# Patient Record
(demographics unavailable — no encounter records)

---

## 2025-02-27 NOTE — DISCUSSION/SUMMARY
[de-identified] : Chief complaint: Left hip pain  HPI: Patient is a 36-year-old female who presents to the office today for the evaluation of left hip pain which manifested approximately 4 weeks ago without any known fall, injury, or trauma.  Patient reports that the pain seems to be getting worse since onset.  She localizes the pain primarily to the anterior aspect of the left hip by pointing.  Pain is constant but is worse with weightbearing and ambulation.  Patient has been taking Tylenol and Motrin over-the-counter which seems to help.  She reports that she presented to [facility] emergency department where x-rays of the left hip were performed which were negative as per the patient's report.  She does not present with the impression as per the radiologist or with the disc of the x-rays performed.  Patient denies any mechanical symptoms of the left hip.  No clicking or popping noted.  Has never had any similar symptoms in the past.  No reported previous surgical intervention to the left hip.  ROS: Positive for left hip pain  Physical examination left hip:  There is no appreciable edema No palpable masses Patient has mildly limited range of motion to the left hip in flexion, extension, abduction, adduction when compared with the contralateral hip There is good range of motion with internal and external rotation passively Patient has pain with resisted hip flexion Pain with internal rotation of the hip, pain with BALWINDER and FADIR There is tenderness to palpation over the ASIS and anterior aspect of the hip No appreciable tenderness over the left SI joint, left gluteal musculature, left greater trochanter, diffuse left thigh Nonantalgic gait  2 view x-rays of the left hip and pelvis performed in the office today show no obvious acute displaced fracture, subluxation, dislocation   Assessment/plan: acute pain of the left hip, discussed treatment options with the patient   1.  Patient reports being cleared by her gastroenterologist to take ibuprofen, A prescription for ibuprofen 800 mg as needed 3 times daily with food was sent to the patient's pharmacy, confirmed no contraindications to NSAIDS. Patient denies being on a blood thinner. Denies history of GIB or GI ulcer.  Discussed in detail with the patient that they cannot take over-the-counter NSAIDs including but not limited to ibuprofen, Advil, Aleve, or Motrin while taking this medication.  They can continue to take over-the-counter Tylenol. 2.  Recommend formal physical therapy for the left hip at this time, prescription was provided, I did direct the patient to get a home exercise program at the direction of her physical therapist so that she can perform these exercises at home 3.  Patient can apply ice or heat to the left hip on an as-needed basis with sensory precautions 4.  Discussed activity modifications with the patient  Patient will be provided with a 6-week follow-up with me for repeat evaluation, at that time if there is no significant improvement in her pain we will consider submitting for an MRI of the left hip, patient verbalized understanding of all findings in the office today, she agrees to follow-up as directed

## 2025-03-28 NOTE — HISTORY OF PRESENT ILLNESS
[Pregnancy History] : girl [___] : at [unfilled] weeks GA [TextBox_4] : 36-year-old  (3 cesareans) LMP 3/22/2025 presents for an annual GYN exam.  She had a bilateral salpingectomy with her last  section.  She is status post gastric sleeve surgery in 2023 at [facility] and was diagnosed with epilepsy 3/20/2023 for which she takes gabapentin and Lamictal [Currently Active] : currently active [Men] : men [FreeTextEntry3] : b/l salpingectomy

## 2025-03-28 NOTE — PHYSICAL EXAM
[Chaperone Present] : A chaperone was present in the examining room during all aspects of the physical examination [FreeTextEntry2] : Mery [Examination Of The Breasts] : a normal appearance [No Masses] : no breast masses were palpable [Labia Majora] : normal [Labia Minora] : normal [Scant] : There was scant vaginal bleeding [Normal] : normal [Uterine Adnexae] : normal [TextEntry] : pierced left nipple